# Patient Record
Sex: MALE | URBAN - METROPOLITAN AREA
[De-identification: names, ages, dates, MRNs, and addresses within clinical notes are randomized per-mention and may not be internally consistent; named-entity substitution may affect disease eponyms.]

---

## 2022-04-19 ENCOUNTER — OFFICE VISIT (OUTPATIENT)
Dept: URBAN - METROPOLITAN AREA CLINIC 50 | Facility: CLINIC | Age: 77
End: 2022-04-19
Payer: MEDICARE

## 2022-04-19 DIAGNOSIS — H40.1131 PRIMARY OPEN-ANGLE GLAUCOMA, MILD STAGE, BILATERAL: ICD-10-CM

## 2022-04-19 DIAGNOSIS — H35.373 PUCKERING OF MACULA, BILATERAL: Primary | ICD-10-CM

## 2022-04-19 DIAGNOSIS — E11.9 TYPE 2 DIABETES MELLITUS WITHOUT COMPLICATIONS: ICD-10-CM

## 2022-04-19 PROCEDURE — 99214 OFFICE O/P EST MOD 30 MIN: CPT | Performed by: OPHTHALMOLOGY

## 2022-04-19 PROCEDURE — 92134 CPTRZ OPH DX IMG PST SGM RTA: CPT | Performed by: OPHTHALMOLOGY

## 2022-04-19 PROCEDURE — 92020 GONIOSCOPY: CPT | Performed by: OPHTHALMOLOGY

## 2022-04-19 RX ORDER — LATANOPROST 50 UG/ML
0.005 % SOLUTION OPHTHALMIC
Qty: 2.5 | Refills: 11 | Status: ACTIVE
Start: 2022-04-19

## 2022-04-19 RX ORDER — ATENOLOL 25 MG/1
25 MG TABLET ORAL
Refills: 0 | Status: ACTIVE
Start: 2022-04-19

## 2022-04-19 RX ORDER — METFORMIN HYDROCHLORIDE 500 MG/1
500 MG TABLET, FILM COATED ORAL
Qty: 0 | Refills: 0 | Status: ACTIVE
Start: 2022-04-19

## 2022-04-19 RX ORDER — CETIRIZINE HCL 10 MG/1
10 MG CAPSULE ORAL
Refills: 0 | Status: ACTIVE
Start: 2022-04-19

## 2022-04-19 RX ORDER — LOSARTAN POTASSIUM 25 MG/1
25 MG TABLET, FILM COATED ORAL
Refills: 0 | Status: ACTIVE
Start: 2022-04-19

## 2022-04-19 RX ORDER — PILOCARPINE HYDROCHLORIDE 10 MG/ML
1 % SOLUTION/ DROPS OPHTHALMIC
Qty: 5 | Refills: 6 | Status: ACTIVE
Start: 2022-04-19

## 2022-04-19 RX ORDER — CLOPIDOGREL 75 MG/1
75 MG TABLET, FILM COATED ORAL
Refills: 0 | Status: ACTIVE
Start: 2022-04-19

## 2022-04-19 RX ORDER — SIMVASTATIN 40 MG/1
40 MG TABLET, FILM COATED ORAL
Refills: 0 | Status: ACTIVE
Start: 2022-04-19

## 2022-04-19 RX ORDER — HYPROMELLOSE 2910 (4000 MPA.S) 3 MG/ML
0.3 % SOLUTION/ DROPS OPHTHALMIC; TOPICAL
Refills: 0 | Status: ACTIVE
Start: 2022-04-19

## 2022-04-19 ASSESSMENT — INTRAOCULAR PRESSURE
OD: 23
OS: 23

## 2022-04-19 NOTE — IMPRESSION/PLAN
Impression: Type 2 diabetes mellitus without complications: L10.8. Plan: No diabetic retinopathy is noted. Patient is advised that a yearly ophthalmic exam is recommended. Return sooner if any new symptoms.

## 2022-05-19 ENCOUNTER — TESTING ONLY (OUTPATIENT)
Dept: URBAN - METROPOLITAN AREA CLINIC 50 | Facility: CLINIC | Age: 77
End: 2022-05-19
Payer: MEDICARE

## 2022-05-19 PROCEDURE — 76514 ECHO EXAM OF EYE THICKNESS: CPT | Performed by: OPHTHALMOLOGY

## 2022-05-19 PROCEDURE — 92083 EXTENDED VISUAL FIELD XM: CPT | Performed by: OPHTHALMOLOGY

## 2022-05-25 ENCOUNTER — OFFICE VISIT (OUTPATIENT)
Dept: URBAN - METROPOLITAN AREA CLINIC 50 | Facility: CLINIC | Age: 77
End: 2022-05-25
Payer: MEDICARE

## 2022-05-25 DIAGNOSIS — H40.1131 PRIMARY OPEN-ANGLE GLAUCOMA, BILATERAL, MILD STAGE: Primary | ICD-10-CM

## 2022-05-25 PROCEDURE — 99213 OFFICE O/P EST LOW 20 MIN: CPT | Performed by: OPHTHALMOLOGY

## 2022-05-25 PROCEDURE — 92083 EXTENDED VISUAL FIELD XM: CPT | Performed by: OPHTHALMOLOGY

## 2022-05-25 PROCEDURE — 92133 CPTRZD OPH DX IMG PST SGM ON: CPT | Performed by: OPHTHALMOLOGY

## 2022-05-25 ASSESSMENT — INTRAOCULAR PRESSURE
OD: 20
OD: 18
OS: 18

## 2022-05-25 NOTE — IMPRESSION/PLAN
Impression: Primary open-angle glaucoma, bilateral, mild stage: H40.1131. Plan: - Continue as previously prescribed Latanoprost 0.005 % eye drops : Instill one drop in both eyes at bedtime and Pilocarpine x4 daily in both eyes.

## 2022-10-05 ENCOUNTER — OFFICE VISIT (OUTPATIENT)
Dept: URBAN - METROPOLITAN AREA CLINIC 50 | Facility: CLINIC | Age: 77
End: 2022-10-05
Payer: MEDICARE

## 2022-10-05 DIAGNOSIS — H40.1131 PRIMARY OPEN-ANGLE GLAUCOMA, MILD STAGE, BILATERAL: Primary | ICD-10-CM

## 2022-10-05 DIAGNOSIS — H35.373 PUCKERING OF MACULA, BILATERAL: ICD-10-CM

## 2022-10-05 DIAGNOSIS — H02.412 MECHANICAL PTOSIS OF LEFT EYELID: ICD-10-CM

## 2022-10-05 PROCEDURE — 99213 OFFICE O/P EST LOW 20 MIN: CPT | Performed by: OPHTHALMOLOGY

## 2022-10-05 ASSESSMENT — INTRAOCULAR PRESSURE
OS: 17
OS: 19
OD: 17

## 2022-10-05 NOTE — IMPRESSION/PLAN
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 Plan: Condition is stable. Will continue to monitor.

## 2022-10-05 NOTE — IMPRESSION/PLAN
Impression: Primary open-angle glaucoma, mild stage, bilateral: H40.1131. Plan: Patient's intraocular pressure is within acceptable range and examination is unchanged. Continue current treatment. - Continue as previously prescribed Pilocarpine x4 daily in both eyes.

## 2023-01-13 ENCOUNTER — OFFICE VISIT (OUTPATIENT)
Dept: URBAN - METROPOLITAN AREA CLINIC 46 | Facility: CLINIC | Age: 78
End: 2023-01-13
Payer: MEDICARE

## 2023-01-13 DIAGNOSIS — H40.1131 PRIMARY OPEN-ANGLE GLAUCOMA, BILATERAL, MILD STAGE: Primary | ICD-10-CM

## 2023-01-13 PROCEDURE — 99213 OFFICE O/P EST LOW 20 MIN: CPT | Performed by: OPHTHALMOLOGY

## 2023-01-13 ASSESSMENT — INTRAOCULAR PRESSURE
OD: 18
OS: 14
OD: 15
OS: 18

## 2023-03-24 ENCOUNTER — TESTING ONLY (OUTPATIENT)
Dept: URBAN - METROPOLITAN AREA CLINIC 46 | Facility: CLINIC | Age: 78
End: 2023-03-24
Payer: MEDICARE

## 2023-03-24 DIAGNOSIS — H40.1131 PRIMARY OPEN-ANGLE GLAUCOMA, BILATERAL, MILD STAGE: Primary | ICD-10-CM

## 2023-04-14 ENCOUNTER — OFFICE VISIT (OUTPATIENT)
Dept: URBAN - METROPOLITAN AREA CLINIC 46 | Facility: CLINIC | Age: 78
End: 2023-04-14
Payer: MEDICARE

## 2023-04-14 DIAGNOSIS — Z96.1 PRESENCE OF INTRAOCULAR LENS: ICD-10-CM

## 2023-04-14 DIAGNOSIS — H40.1131 PRIMARY OPEN-ANGLE GLAUCOMA, MILD STAGE, BILATERAL: Primary | ICD-10-CM

## 2023-04-14 PROCEDURE — 99213 OFFICE O/P EST LOW 20 MIN: CPT | Performed by: OPHTHALMOLOGY

## 2023-04-14 PROCEDURE — 92133 CPTRZD OPH DX IMG PST SGM ON: CPT | Performed by: OPHTHALMOLOGY

## 2023-04-14 ASSESSMENT — INTRAOCULAR PRESSURE
OS: 17
OD: 17

## 2023-04-14 NOTE — IMPRESSION/PLAN
Impression: Primary open-angle glaucoma, mild stage, bilateral: H40.1131. Plan: Patient's intraocular pressure is within acceptable range and examination is unchanged. Continue current treatment. - Continue as previously prescribed Pilocarpine x4 daily in both eyes. 
 - Continue as previously prescribed Latanoprost 0.005 % eye drops : Instill one drop in both eyes at bedtime

## 2023-09-25 ENCOUNTER — OFFICE VISIT (OUTPATIENT)
Dept: URBAN - METROPOLITAN AREA CLINIC 50 | Facility: CLINIC | Age: 78
End: 2023-09-25
Payer: MEDICARE

## 2023-09-25 DIAGNOSIS — Z96.1 PRESENCE OF INTRAOCULAR LENS: ICD-10-CM

## 2023-09-25 DIAGNOSIS — H40.1131 PRIMARY OPEN-ANGLE GLAUCOMA, MILD STAGE, BILATERAL: Primary | ICD-10-CM

## 2023-09-25 PROCEDURE — 99212 OFFICE O/P EST SF 10 MIN: CPT | Performed by: OPHTHALMOLOGY

## 2023-09-25 RX ORDER — PILOCARPINE HYDROCHLORIDE 10 MG/ML
1 % SOLUTION/ DROPS OPHTHALMIC
Qty: 15 | Refills: 0 | Status: ACTIVE
Start: 2023-09-25

## 2023-09-25 RX ORDER — LATANOPROST 50 UG/ML
0.005 % SOLUTION OPHTHALMIC
Qty: 2.5 | Refills: 11 | Status: ACTIVE
Start: 2023-09-25

## 2023-09-25 ASSESSMENT — INTRAOCULAR PRESSURE
OD: 21
OS: 23
OS: 22
OD: 23

## 2023-11-06 ENCOUNTER — OFFICE VISIT (OUTPATIENT)
Dept: URBAN - METROPOLITAN AREA CLINIC 50 | Facility: CLINIC | Age: 78
End: 2023-11-06
Payer: MEDICARE

## 2023-11-06 DIAGNOSIS — H40.1131 PRIMARY OPEN-ANGLE GLAUCOMA, MILD STAGE, BILATERAL: Primary | ICD-10-CM

## 2023-11-06 DIAGNOSIS — H02.412 MECHANICAL PTOSIS OF LEFT EYELID: ICD-10-CM

## 2023-11-06 DIAGNOSIS — Z96.1 PRESENCE OF INTRAOCULAR LENS: ICD-10-CM

## 2023-11-06 PROCEDURE — 99212 OFFICE O/P EST SF 10 MIN: CPT | Performed by: OPHTHALMOLOGY

## 2023-11-06 ASSESSMENT — INTRAOCULAR PRESSURE
OS: 18
OS: 14
OD: 14
OD: 20

## 2024-04-17 ENCOUNTER — OFFICE VISIT (OUTPATIENT)
Dept: URBAN - METROPOLITAN AREA CLINIC 50 | Facility: CLINIC | Age: 79
End: 2024-04-17
Payer: MEDICARE

## 2024-04-17 DIAGNOSIS — H40.1131 PRIMARY OPEN-ANGLE GLAUCOMA, MILD STAGE, BILATERAL: Primary | ICD-10-CM

## 2024-04-17 PROCEDURE — 99213 OFFICE O/P EST LOW 20 MIN: CPT | Performed by: OPHTHALMOLOGY

## 2024-04-17 ASSESSMENT — INTRAOCULAR PRESSURE
OS: 17
OD: 16

## 2024-08-26 ENCOUNTER — HOSPITAL ENCOUNTER (OUTPATIENT)
Age: 79
Discharge: HOME | End: 2024-08-26
Payer: MEDICARE

## 2024-08-26 VITALS
SYSTOLIC BLOOD PRESSURE: 153 MMHG | DIASTOLIC BLOOD PRESSURE: 82 MMHG | HEART RATE: 69 BPM | RESPIRATION RATE: 17 BRPM | OXYGEN SATURATION: 99 %

## 2024-08-26 VITALS
RESPIRATION RATE: 18 BRPM | SYSTOLIC BLOOD PRESSURE: 149 MMHG | DIASTOLIC BLOOD PRESSURE: 83 MMHG | OXYGEN SATURATION: 100 % | HEART RATE: 66 BPM | TEMPERATURE: 96.98 F

## 2024-08-26 VITALS — BODY MASS INDEX: 23.7 KG/M2

## 2024-08-26 VITALS
SYSTOLIC BLOOD PRESSURE: 155 MMHG | RESPIRATION RATE: 18 BRPM | DIASTOLIC BLOOD PRESSURE: 78 MMHG | OXYGEN SATURATION: 98 % | HEART RATE: 72 BPM | TEMPERATURE: 97.8 F

## 2024-08-26 VITALS
OXYGEN SATURATION: 100 % | HEART RATE: 68 BPM | DIASTOLIC BLOOD PRESSURE: 82 MMHG | RESPIRATION RATE: 20 BRPM | SYSTOLIC BLOOD PRESSURE: 136 MMHG

## 2024-08-26 VITALS
DIASTOLIC BLOOD PRESSURE: 90 MMHG | SYSTOLIC BLOOD PRESSURE: 147 MMHG | RESPIRATION RATE: 17 BRPM | TEMPERATURE: 97.16 F | HEART RATE: 68 BPM | OXYGEN SATURATION: 99 %

## 2024-08-26 VITALS
DIASTOLIC BLOOD PRESSURE: 87 MMHG | RESPIRATION RATE: 16 BRPM | HEART RATE: 67 BPM | SYSTOLIC BLOOD PRESSURE: 143 MMHG | OXYGEN SATURATION: 98 %

## 2024-08-26 VITALS
SYSTOLIC BLOOD PRESSURE: 139 MMHG | HEART RATE: 67 BPM | DIASTOLIC BLOOD PRESSURE: 78 MMHG | OXYGEN SATURATION: 98 % | RESPIRATION RATE: 15 BRPM

## 2024-08-26 DIAGNOSIS — M51.26: Primary | ICD-10-CM

## 2024-08-26 DIAGNOSIS — M48.07: ICD-10-CM

## 2024-08-26 DIAGNOSIS — M54.16: ICD-10-CM

## 2024-08-26 PROCEDURE — 76000 FLUOROSCOPY <1 HR PHYS/QHP: CPT

## 2024-08-26 PROCEDURE — 63030 LAMOT DCMPRN NRV RT 1 LMBR: CPT

## 2024-08-26 PROCEDURE — 72100 X-RAY EXAM L-S SPINE 2/3 VWS: CPT

## 2024-08-26 NOTE — PM.OP.1
Operative Date/Time/Diagnoses
Date of procedure: 08/26/24
Time of procedure: 16:40
Pre-op diagnosis: 1. L5-S1 disc herniation 
2. L5-S1 spinal stenosis 

Post-op diagnosis: same

Procedure & Clinicians
Procedure: 
1. L5-S1 left microdiscectomy
2. Utilization of microsurgical technique and operating microscope

Same procedure as scheduled: Yes
Indications: 
Patient has been having acute onset back pain and worsening lumbar radiculopathy.
Patient was found to have a large L5-S1 disc herniation with migration causing significant central and lateral recess stenosis on the left side correlating with the patient's symptoms.
Patient failed multiple conservative management with worsening pain weakness and numbness in his lower extremity.  Patient has been having difficulty performing activity of daily living.  After discussing risks benefits of treatment options, patient 
elected proceed with surgery.
Surgeon: Fouzia Staton
Assistant: Jenna Beh
Click Yes if Unassisted: No
Anesthesia Type: General
Operative Notes
Closure Type: primary
Specimen(s): none sent
Estimated Blood Loss (mL): 5
Blood products transfused: none
Procedure in detail: 
Patient was seen in the preoperative area. Risks and benefits of the surgery was discussed with the patient. Informed consent was obtained from the patient and placed in the chart.  Surgical site was marked. Patient was taken to the operative room. 
General anesthesia was administered. Prophylactic antibiotic was given to the patient less than 30 min before the incision was made. Patient was placed into a prone position on the Marquez table. Patient's back was then prepped and draped in the 
sterile fashion. Time-out was performed at this time.

Using AP and lateral C-arm imaging the interval between L5-S1 was identified and marked on patient's back.  A 1 inch incision 1 in from midline was made on the left side.  The fascia was incised in line with skin incision. Globus MARS retractors was 
placed inside the incision and docked onto the L5 lamina.  Using microsurgical technique and operating microscope, a L5 laminotomy was performed using a Kerrison rongeur.  Liagamentum flavum was resected at the site of the laminotomy. The disc space 
at L5-S1 was identified. Microdiscectomy was performed by incising the annulus with #11 blade. Microcurettes and pituitary was used to removed herniated disc fragments of disc from the epidural space.  There are multiple fragments extruded into the 
epidural space and migrated cephalad behind the L5 vertebral body.  Micro curette was used to carefully free up the fragments and remove them using micro curette and pituitary.

After the microdiskectomy was completed, the area medial lateral superior and inferior to the area of the microdiskectomy was inspected and explored using a micro curette.  No other impinging structure was identified.

The wound was then irrigated with sterile normal saline. 40 mg Depo-Medrol was placed into the epidural space. The deep fascia was closed with 1-0  Vicryl. The subcutaneous tissue was closed with 2-0 Vicryl. The skin was closed with 4-0 Monocryl.

Patient tolerated the procedure well.  There were no complications. Patient was transferred recovery room in stable condition.

The Operation could not have been safely performed without compromising the technical result or length of the procedure, without the assistance of a skilled surgical assistant. The surgical assistant was medically necessary for proper positioning, 
retraction and manipulation of instruments, proper exposure, surgical preparation,  and manipulation of tissue.
Complications: none
Post-operative
Condition: stable
Disposition: PACU
Plan for aftercare: 
Discharge to home

## 2024-08-26 NOTE — DI.RAD.S_ITS
PROCEDURE:  XR LUMBAR SPINE 2-3V 
  
INDICATIONS:  MICRODISCECTOMY 
  
TECHNIQUE:  2 spot fluoroscopic intraoperative images of the lumbar spine. 
  
COMPARISON:  None. 
  
FINDINGS:   
  
2 spot fluoroscopic intraoperative images demonstrate surgical hardware posterior to the  
L5-S1 disc space on the left. 
  
  
IMPRESSION:   
  
Intraoperative fluoroscopy was utilized for surgical guidance.   
  
  
  
  
Approved by: Oliver Sheridan M.D. on 8/27/2024 at 8:45

## 2025-01-09 ENCOUNTER — OFFICE VISIT (OUTPATIENT)
Dept: URBAN - METROPOLITAN AREA CLINIC 50 | Facility: CLINIC | Age: 80
End: 2025-01-09
Payer: MEDICARE

## 2025-01-09 DIAGNOSIS — H16.223 KERATOCONJUNCTIVITIS SICCA, BILATERAL: ICD-10-CM

## 2025-01-09 DIAGNOSIS — H40.1131 PRIMARY OPEN-ANGLE GLAUCOMA, MILD STAGE, BILATERAL: ICD-10-CM

## 2025-01-09 DIAGNOSIS — H16.143 PUNCTATE KERATITIS, BILATERAL: ICD-10-CM

## 2025-01-09 DIAGNOSIS — H57.812 BROW PTOSIS, LEFT: ICD-10-CM

## 2025-01-09 DIAGNOSIS — Z96.1 PRESENCE OF INTRAOCULAR LENS: ICD-10-CM

## 2025-01-09 DIAGNOSIS — E11.9 DIABETES MELLITUS TYPE 2 WITHOUT MENTION OF COMPLICATION: Primary | ICD-10-CM

## 2025-01-09 DIAGNOSIS — H35.373 PUCKERING OF MACULA, BILATERAL: ICD-10-CM

## 2025-01-09 PROCEDURE — 92083 EXTENDED VISUAL FIELD XM: CPT | Performed by: OPTOMETRIST

## 2025-01-09 PROCEDURE — 92133 CPTRZD OPH DX IMG PST SGM ON: CPT | Performed by: OPTOMETRIST

## 2025-01-09 PROCEDURE — 99204 OFFICE O/P NEW MOD 45 MIN: CPT | Performed by: OPTOMETRIST

## 2025-01-09 RX ORDER — PILOCARPINE HYDROCHLORIDE 10 MG/ML
1 % SOLUTION/ DROPS OPHTHALMIC
Qty: 15 | Refills: 3 | Status: ACTIVE
Start: 2025-01-09

## 2025-01-09 RX ORDER — LATANOPROST 50 UG/ML
0.005 % SOLUTION OPHTHALMIC
Qty: 7.5 | Refills: 3 | Status: ACTIVE
Start: 2025-01-09

## 2025-01-09 ASSESSMENT — INTRAOCULAR PRESSURE
OD: 14
OS: 15

## 2025-04-09 ENCOUNTER — OFFICE VISIT (OUTPATIENT)
Dept: URBAN - METROPOLITAN AREA CLINIC 48 | Facility: CLINIC | Age: 80
End: 2025-04-09
Payer: MEDICARE

## 2025-04-09 DIAGNOSIS — Z96.1 PRESENCE OF PSEUDOPHAKIA: ICD-10-CM

## 2025-04-09 DIAGNOSIS — H40.1131 PRIMARY OPEN-ANGLE GLAUCOMA, MILD STAGE, BILATERAL: Primary | ICD-10-CM

## 2025-04-09 DIAGNOSIS — H43.812 VITREOUS DEGENERATION, LEFT EYE: ICD-10-CM

## 2025-04-09 PROCEDURE — 99204 OFFICE O/P NEW MOD 45 MIN: CPT | Performed by: OPTOMETRIST

## 2025-04-09 ASSESSMENT — INTRAOCULAR PRESSURE
OS: 13
OD: 14